# Patient Record
Sex: MALE | Race: OTHER | HISPANIC OR LATINO | URBAN - METROPOLITAN AREA
[De-identification: names, ages, dates, MRNs, and addresses within clinical notes are randomized per-mention and may not be internally consistent; named-entity substitution may affect disease eponyms.]

---

## 2017-04-14 ENCOUNTER — EMERGENCY (EMERGENCY)
Facility: HOSPITAL | Age: 42
LOS: 1 days | Discharge: HOME | End: 2017-04-14
Attending: EMERGENCY MEDICINE
Payer: MEDICAID

## 2017-04-14 VITALS
TEMPERATURE: 98 F | HEART RATE: 68 BPM | WEIGHT: 190.04 LBS | HEIGHT: 66 IN | DIASTOLIC BLOOD PRESSURE: 79 MMHG | SYSTOLIC BLOOD PRESSURE: 132 MMHG | RESPIRATION RATE: 16 BRPM | OXYGEN SATURATION: 100 %

## 2017-04-14 DIAGNOSIS — M79.604 PAIN IN RIGHT LEG: ICD-10-CM

## 2017-04-14 DIAGNOSIS — M54.5 LOW BACK PAIN: ICD-10-CM

## 2017-04-14 DIAGNOSIS — M79.605 PAIN IN LEFT LEG: ICD-10-CM

## 2017-04-14 PROCEDURE — 99283 EMERGENCY DEPT VISIT LOW MDM: CPT

## 2017-04-14 RX ORDER — IBUPROFEN 200 MG
600 TABLET ORAL ONCE
Qty: 0 | Refills: 0 | Status: COMPLETED | OUTPATIENT
Start: 2017-04-14 | End: 2017-04-14

## 2017-04-14 RX ORDER — IBUPROFEN 200 MG
1 TABLET ORAL
Qty: 30 | Refills: 0 | OUTPATIENT
Start: 2017-04-14 | End: 2017-04-24

## 2017-04-14 RX ORDER — OXYCODONE HYDROCHLORIDE 5 MG/1
1 TABLET ORAL
Qty: 20 | Refills: 0 | OUTPATIENT
Start: 2017-04-14 | End: 2017-04-19

## 2017-04-14 RX ADMIN — Medication 600 MILLIGRAM(S): at 22:17

## 2017-04-14 RX ADMIN — Medication 600 MILLIGRAM(S): at 22:52

## 2017-04-14 NOTE — ED PROVIDER NOTE - MEDICAL DECISION MAKING DETAILS
42 M w/ chronic low back pain. neurovascularly intact, ambulatory, NAD. D/c w/ ortho spine f/u. 42 M w/ chronic low back pain, oc oste-degenerative dz. neurovascularly intact, ambulatory, NAD. D/c w/ ortho spine f/u.

## 2017-04-14 NOTE — ED PROVIDER NOTE - NS ED MD SCRIBE ATTENDING SCRIBE SECTIONS
HIV/VITAL SIGNS( Pullset)/DISPOSITION/PHYSICAL EXAM/HISTORY OF PRESENT ILLNESS/PAST MEDICAL/SURGICAL/SOCIAL HISTORY/REVIEW OF SYSTEMS

## 2017-04-14 NOTE — ED PROVIDER NOTE - OBJECTIVE STATEMENT
Pt refused , prefer family member translate in Beninese. 43 y/o M w/ no significant PMHx p/w low back pain onset 2 months radiating to b/l legs, R>L, described as pulsating, worsening when sitting down. Pt is unable to put pressure on legs and has difficulty walking. Pt has been taking Tylenol and 800 mg ibuprofen for pain to no relief. Pt denies bowel/bladder dysfunction, numbness, tingling, hematuria, dysuria, testicle pain, abd pain, injury/fall/trauma,  or any other complaints. NKDA. Pt is a .

## 2017-04-14 NOTE — ED PROVIDER NOTE - CHPI ED SYMPTOMS NEG
no tingling/no bladder dysfunction/no dysuria, no hematuria, no testicle pain, no abd pain/no numbness/no bowel dysfunction